# Patient Record
Sex: MALE | Race: WHITE | NOT HISPANIC OR LATINO | Employment: UNEMPLOYED | URBAN - METROPOLITAN AREA
[De-identification: names, ages, dates, MRNs, and addresses within clinical notes are randomized per-mention and may not be internally consistent; named-entity substitution may affect disease eponyms.]

---
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
## 2023-05-31 ENCOUNTER — APPOINTMENT (OUTPATIENT)
Dept: LAB | Facility: HOSPITAL | Age: 18
End: 2023-05-31
Payer: COMMERCIAL

## 2023-05-31 DIAGNOSIS — Z91.018 ALLERGY TO OTHER FOODS: ICD-10-CM

## 2023-05-31 PROCEDURE — 36415 COLL VENOUS BLD VENIPUNCTURE: CPT

## 2023-05-31 PROCEDURE — 86003 ALLG SPEC IGE CRUDE XTRC EA: CPT

## 2023-05-31 PROCEDURE — 86008 ALLG SPEC IGE RECOMB EA: CPT

## 2023-06-01 LAB
A-LACTALB IGE QN: 0.54 KAU/I
ALMOND IGE QN: 1.24 KUA/I
ARA H6 PEANUT: 0.66 KUA/I
B-LACTOGLOB IGE QN: 3.06 KAU/I
CASEIN IGE QN: 0.63 KAU/I
HAZELNUT IGE QN: 21 KUA/L
MILK IGE QN: 2.69 KUA/I
PEANUT (RARA H) 1 IGE QN: <0.1 KUA/I
PEANUT (RARA H) 2 IGE QN: 0.59 KUA/I
PEANUT (RARA H) 3 IGE QN: <0.1 KUA/I
PEANUT (RARA H) 8 IGE QN: 0.81 KUA/I
PEANUT (RARA H) 9 IGE QN: <0.1 KUA/I
PEANUT IGE QN: 0.6 KUA/I
PECAN/HICK NUT IGE QN: 0.31 KUA/I
PISTACHIO IGE QN: <0.1 KUA/I
WALNUT IGE QN: 0.58 KUA/I

## 2023-06-06 LAB
Lab: NORMAL
Lab: NORMAL
MANGO IGE QN: 0.23 KU/L
MISCELLANEOUS LAB TEST RESULT: NORMAL

## 2025-05-31 ENCOUNTER — OFFICE VISIT (OUTPATIENT)
Dept: URGENT CARE | Facility: CLINIC | Age: 20
End: 2025-05-31
Payer: COMMERCIAL

## 2025-05-31 VITALS
RESPIRATION RATE: 16 BRPM | BODY MASS INDEX: 23.54 KG/M2 | OXYGEN SATURATION: 96 % | SYSTOLIC BLOOD PRESSURE: 138 MMHG | HEART RATE: 93 BPM | DIASTOLIC BLOOD PRESSURE: 61 MMHG | HEIGHT: 67 IN | WEIGHT: 150 LBS | TEMPERATURE: 98.7 F

## 2025-05-31 DIAGNOSIS — R50.9 FEVER, UNSPECIFIED FEVER CAUSE: ICD-10-CM

## 2025-05-31 DIAGNOSIS — J02.9 ACUTE PHARYNGITIS, UNSPECIFIED ETIOLOGY: Primary | ICD-10-CM

## 2025-05-31 LAB
S PYO AG THROAT QL: NEGATIVE
SARS-COV-2 AG UPPER RESP QL IA: NEGATIVE
VALID CONTROL: NORMAL

## 2025-05-31 PROCEDURE — 87811 SARS-COV-2 COVID19 W/OPTIC: CPT

## 2025-05-31 PROCEDURE — 87880 STREP A ASSAY W/OPTIC: CPT

## 2025-05-31 PROCEDURE — 99213 OFFICE O/P EST LOW 20 MIN: CPT

## 2025-05-31 PROCEDURE — 87070 CULTURE OTHR SPECIMN AEROBIC: CPT

## 2025-05-31 NOTE — PROGRESS NOTES
[facility]        NAME: Chacho Murillo is a 20 y.o. male  : 2005    MRN: [id number]  DATE: May 31, 2025  TIME: 3:29 PM    Assessment and Plan   Acute pharyngitis, unspecified etiology [J02.9]  1. Acute pharyngitis, unspecified etiology        2. Fever, unspecified fever cause  POCT rapid strepA    Poct Covid 19 Rapid Antigen Test    Throat culture            Patient Instructions     Your rapid Covid and strep tests were negative. No antibiotics are needed at this time.     Throat swab will be sent for definitive culture. You can download [employer] for the results which take approximately 48-72 hours. You will be notified if positive.     For sore throat you can use Cepacol lozenges, do warm salt water gargles, drink warm water with lemon or herbal teas, or use an over-the-counter throat spray (Chloraseptic).    Tylenol and Motrin for fevers, body aches, throat pain.    Drink plenty of fluids to stay hydrated.    Follow up with your PCP in 3-5 days if symptoms persist.    Go to the ER if symptoms significantly worsen.     If tests are performed, our office will contact you with results only if changes need to made to the care plan discussed with you at the visit. You can review your full results on [facility]      Chief Complaint     Chief Complaint   Patient presents with    Cold Like Symptoms     Pt reports sore throar, non-productive cough, fever of 101.1 F, body aches, h/a, and laryngitis with onset of symptoms two days ago. C/o fever onset last night. Managing symptoms with Advil last dose last night. Denies testing at home for Covid.          History of Present Illness       20-year-old male presenting with headache, body aches, fevers Tmax 101, sore throat, and dry cough x 2 days. Recently lost his voice. Mild nausea, no vomiting or diarrhea. Taking Advil for fevers, last dose last night.        Review of Systems   Review of Systems   Constitutional:  Positive for fever.  "  HENT:  Positive for sore throat and voice change. Negative for congestion, ear discharge, ear pain and sinus pressure.    Eyes:  Negative for discharge and redness.   Respiratory:  Positive for cough. Negative for shortness of breath and wheezing.    Cardiovascular:  Negative for chest pain.   Gastrointestinal:  Positive for nausea. Negative for abdominal pain, diarrhea and vomiting.   Musculoskeletal:  Positive for myalgias.   Skin:  Negative for rash.   Neurological:  Positive for headaches. Negative for dizziness and light-headedness.         Current Medications     Current Medications[1]    Current Allergies     Allergies as of 05/31/2025 - Reviewed 05/31/2025   Allergen Reaction Noted    Cat dander  08/23/2013    [place] flavor - food allergy  08/27/2020    Nuts - food allergy  02/09/2017            The following portions of the patient's history were reviewed and updated as appropriate: allergies, current medications, past family history, past medical history, past social history, past surgical history and problem list.     Past Medical History[2]    Past Surgical History[3]    Family History[4]      Medications have been verified.        Objective   /61 (BP Location: Left arm, Patient Position: Sitting, Cuff Size: Standard)   Pulse 93   Temp 98.7 °F (37.1 °C) (Tympanic)   Resp 16   Ht 5' 7\" (1.702 m)   Wt 68 kg (150 lb)   SpO2 96%   BMI 23.49 kg/m²        Physical Exam     Physical Exam  Vitals and nursing note reviewed.   Constitutional:       General: He is not in acute distress.     Appearance: He is not ill-appearing or diaphoretic.   HENT:      Head: Normocephalic and atraumatic.      Right Ear: Tympanic membrane, ear canal and external ear normal.      Left Ear: Tympanic membrane, ear canal and external ear normal.      Nose: Nose normal.      Mouth/Throat:      Mouth: Mucous membranes are moist.      Pharynx: Posterior oropharyngeal erythema present.      Tonsils: No tonsillar exudate. 2+ " on the right. 1+ on the left.      Comments: Patient states R tonsil always larger. Loss of voice noted. Patient able to speak and manage own secretions without difficulty.    Eyes:      Conjunctiva/sclera: Conjunctivae normal.       Cardiovascular:      Rate and Rhythm: Normal rate and regular rhythm.   Pulmonary:      Effort: Pulmonary effort is normal.      Breath sounds: Normal breath sounds.     Musculoskeletal:         General: Normal range of motion.      Cervical back: Normal range of motion and neck supple.   Lymphadenopathy:      Cervical: No cervical adenopathy.     Skin:     General: Skin is warm and dry.      Capillary Refill: Capillary refill takes less than 2 seconds.     Neurological:      Mental Status: He is alert and oriented to person, place, and time.                        [1]   Current Outpatient Medications:     Auvi-Q 0.3 MG/0.3ML SOAJ, , Disp: , Rfl:     cetirizine (ZyrTEC) 10 mg tablet, Take 1 tablet by mouth, Disp: , Rfl:     diphenhydrAMINE (BENADRYL) 25 mg tablet, Take 1 tablet (25 mg total) by mouth every 6 (six) hours as needed for itching, Disp: 20 tablet, Rfl: 0    Multiple Vitamins-Minerals (Multivitamin Adult) CHEW, Chew, Disp: , Rfl:     Cholecalciferol (VITAMIN D3 PO), Take 4,000 Units by mouth (Patient not taking: Reported on 5/31/2025), Disp: , Rfl:     tretinoin (RETIN-A) 0.025 % cream, Apply topically daily at bedtime (Patient not taking: Reported on 5/31/2025), Disp: 45 g, Rfl: 3  [2]   Past Medical History:  Diagnosis Date    Allergic     Unspec. food allergies    Asthma     Current episode of major depressive disorder without prior episode 8/27/2020    Eczema     Environmental allergies     Gastroenteritis, eosinophilic    [3] No past surgical history on file.  [4]   Family History  Problem Relation Name Age of Onset    Allergies Mother      Obesity Mother      Anxiety disorder Mother      Asperger's syndrome Brother      Depression Cousin      Suicide Attempts Cousin       Completed Suicide  Cousin      Anxiety disorder Family      Asperger's syndrome Family      ADD / ADHD Family      Hyperlipidemia Father      Breast cancer Maternal Grandmother      Skin cancer Maternal Grandmother      Hyperlipidemia Maternal Grandmother      Hypertension Maternal Grandmother      Diabetes Maternal Grandmother      Prostate cancer Maternal Grandfather      Lung cancer Maternal Grandfather      Hyperlipidemia Paternal Grandmother      Hyperlipidemia Paternal Grandfather

## 2025-05-31 NOTE — PATIENT INSTRUCTIONS
Your rapid Covid and strep tests were negative. No antibiotics are needed at this time.     Throat swab will be sent for definitive culture. You can download [employer] for the results which take approximately 48-72 hours. You will be notified if positive.     For sore throat you can use Cepacol lozenges, do warm salt water gargles, drink warm water with lemon or herbal teas, or use an over-the-counter throat spray (Chloraseptic).    Tylenol and Motrin for fevers, body aches, throat pain.    Drink plenty of fluids to stay hydrated.    Follow up with your PCP in 3-5 days if symptoms persist.    Go to the ER if symptoms significantly worsen.

## 2025-06-03 LAB — BACTERIA THROAT CULT: NORMAL
